# Patient Record
Sex: MALE | Race: BLACK OR AFRICAN AMERICAN | NOT HISPANIC OR LATINO | URBAN - METROPOLITAN AREA
[De-identification: names, ages, dates, MRNs, and addresses within clinical notes are randomized per-mention and may not be internally consistent; named-entity substitution may affect disease eponyms.]

---

## 2021-08-10 ENCOUNTER — EMERGENCY (EMERGENCY)
Facility: HOSPITAL | Age: 71
LOS: 1 days | Discharge: HOME | End: 2021-08-10
Attending: STUDENT IN AN ORGANIZED HEALTH CARE EDUCATION/TRAINING PROGRAM | Admitting: STUDENT IN AN ORGANIZED HEALTH CARE EDUCATION/TRAINING PROGRAM
Payer: MEDICARE

## 2021-08-10 VITALS
OXYGEN SATURATION: 97 % | HEART RATE: 74 BPM | DIASTOLIC BLOOD PRESSURE: 97 MMHG | RESPIRATION RATE: 18 BRPM | TEMPERATURE: 99 F | SYSTOLIC BLOOD PRESSURE: 174 MMHG

## 2021-08-10 VITALS
OXYGEN SATURATION: 100 % | DIASTOLIC BLOOD PRESSURE: 70 MMHG | RESPIRATION RATE: 18 BRPM | HEART RATE: 62 BPM | TEMPERATURE: 99 F | SYSTOLIC BLOOD PRESSURE: 157 MMHG

## 2021-08-10 PROCEDURE — 99284 EMERGENCY DEPT VISIT MOD MDM: CPT

## 2021-08-10 PROCEDURE — 73030 X-RAY EXAM OF SHOULDER: CPT | Mod: 26,RT

## 2021-08-10 PROCEDURE — 73060 X-RAY EXAM OF HUMERUS: CPT | Mod: 26,RT

## 2021-08-10 RX ORDER — IBUPROFEN 200 MG
1 TABLET ORAL
Qty: 20 | Refills: 0
Start: 2021-08-10

## 2021-08-10 RX ORDER — IBUPROFEN 200 MG
600 TABLET ORAL ONCE
Refills: 0 | Status: COMPLETED | OUTPATIENT
Start: 2021-08-10 | End: 2021-08-10

## 2021-08-10 RX ADMIN — Medication 600 MILLIGRAM(S): at 10:34

## 2021-08-10 NOTE — ED PROVIDER NOTE - NSDCPRINTRESULTS_ED_ALL_ED
Impression: Type 2 diabetes mellitus w/o complication: V12.8. Plan: Discussed diagnosis with patient. No diabetic retinopathy. Discussed ocular and systemic benefits of blood sugar control. Will continue to monitor. Patient requests all Lab, Cardiology, and Radiology Results on their Discharge Instructions

## 2021-08-10 NOTE — ED ADULT TRIAGE NOTE - CHIEF COMPLAINT QUOTE
Pt c/o right shoulder pain s/p mechanical fall 2 days ago. Pt states he hit head, denies LOC and denies blood thinners. PMH DM?, htn

## 2021-08-10 NOTE — ED PROVIDER NOTE - OBJECTIVE STATEMENT
71 yo M past medical history dm on metformin presents for right shoulder pain after trip and fall 2 days ago denies head trauma or loc. reports pain to right upper arm. denies other pain, neck pain, cp, sob abd pain, numbness or weakness  Patient denies taking anti-platelet or anti-coagulation agent.  denies etoh or drugs.   reports did come to ed earlier bc no one to drive him until today.

## 2021-08-10 NOTE — ED PROVIDER NOTE - CROS ED ROS STATEMENT
11/14/2017 8:12 AM 
 
Ms. [facility] [address] [place] [address] [facility]: 
 
Please find your most recent results below. Resulted Orders CBC W/O DIFF Result Value Ref Range WBC 5.9 3.4 - 10.8 x10E3/uL  
 RBC 4.90 3.77 - 5.28 x10E6/uL HGB 14.1 11.1 - 15.9 g/dL HCT 42.0 34.0 - 46.6 % MCV 86 79 - 97 fL  
 MCH 28.8 26.6 - 33.0 pg  
 MCHC 33.6 31.5 - 35.7 g/dL  
 RDW 13.7 12.3 - 15.4 % PLATELET 149 850 - 666 x10E3/uL Narrative Performed at:  [address]  [id number] : Quincy Pyle MD, Phone:  [phone] LIPID PANEL Result Value Ref Range Cholesterol, total 223 (H) 100 - 199 mg/dL Triglyceride 62 0 - 149 mg/dL HDL Cholesterol 91 >39 mg/dL VLDL, calculated 12 5 - 40 mg/dL LDL, calculated 120 (H) 0 - 99 mg/dL Narrative Performed at:  [address]  [id number] : Quincy Pyle MD, Phone:  [phone] METABOLIC PANEL, COMPREHENSIVE Result Value Ref Range Glucose 86 65 - 99 mg/dL BUN 16 6 - 24 mg/dL Creatinine 0.90 0.57 - 1.00 mg/dL GFR est non-AA 71 >59 mL/min/1.73 GFR est AA 82 >59 mL/min/1.73  
 BUN/Creatinine ratio 18 9 - 23 Sodium 141 134 - 144 mmol/L Potassium 4.8 3.5 - 5.2 mmol/L Chloride 98 96 - 106 mmol/L  
 CO2 27 18 - 29 mmol/L Calcium 10.1 8.7 - 10.2 mg/dL Protein, total 7.2 6.0 - 8.5 g/dL Albumin 4.8 3.5 - 5.5 g/dL GLOBULIN, TOTAL 2.4 1.5 - 4.5 g/dL A-G Ratio 2.0 1.2 - 2.2 Bilirubin, total 0.5 0.0 - 1.2 mg/dL Alk. phosphatase 58 39 - 117 IU/L  
 AST (SGOT) 12 0 - 40 IU/L  
 ALT (SGPT) 9 0 - 32 IU/L Narrative Performed at:  [address]  [id number] : Quincy Pyle MD, Phone:  [phone] CVD REPORT Result Value Ref Range INTERPRETATION Note Comment: Supplemental report is available. Narrative Performed at:  [address] [place] [address]  [id number] : Brock Keenan PhD, Phone:  [phone] RECOMMENDATIONS: 
 
   
No anemia Total cholesterol over 200, but is because her \"good\" cholesterol is so good -- current 10 year cardiovascular risk is 1.4% (therapy not recommended) Normal glucose Normal kidney function Normal liver function Nice to see you -- hope you continue to do well Please call me if you have any questions: [phone] Sincerely, Prakash Paiz MD 
 all other ROS negative except as per HPI

## 2021-08-10 NOTE — ED PROVIDER NOTE - PHYSICAL EXAMINATION
GEN: no acute respiratory distress. nontoxic, speaking comfortably in full sentences, ambulating with steady gait.  HEENT: NCAT. 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 PERRL 4mm, EOMI, MMM, oropharynx wnl.  Neck: no JVD, trachea midline, no LAD, FROM, non-tender  CV: RRR. +S1S2, no murmur. 2+ pulses in 4 extremities, cap refill <2 sec  Chest: CTA B/l. no wheezing, rales, rhonchi. no retractions. good air movement. no tenderness. no rash or ecchymosis  ABD: +BS, soft, non distended, non tender. No guarding/rebound. No ecchymosis  MSK: No clubbing, cyanosis, edema. decreased ROM of r shoulder. pain with r shoulder ROM. +R prox humeral tenderness to palpation. No midline or paraspinal tenderness. no spinal step-offs.  Neuro: AAOX3. Sensation intact, motor 5/5 throughout, limited strength exam of R shoulder 2/2 pain R elbow wrist digits normal no ataxia  SKIN: No lac, abrasions

## 2021-08-10 NOTE — ED PROVIDER NOTE - CLINICAL SUMMARY MEDICAL DECISION MAKING FREE TEXT BOX
right shoulder upper humerus pain after fall 2 days ago. neurovasc intact. poss fx/dislocation. plan: xr, symptom relief prn reassess 69 yo M past medical history dm on metformin presents with right shoulder upper humerus pain after fall 2 days ago. neurovasc intact. poss fx/dislocation. plan: xr, symptom relief prn reassess

## 2021-08-10 NOTE — ED PROVIDER NOTE - PATIENT PORTAL LINK FT
You can access the FollowMyHealth Patient Portal offered by [facility] by registering at the following website: [email or web address]. By joining [employer]’s FollowMyHealth portal, you will also be able to view your health information using other applications (apps) compatible with our system.

## 2021-08-10 NOTE — ED PROVIDER NOTE - NSFOLLOWUPINSTRUCTIONS_ED_ALL_ED_FT
Take motrin 600mg every 8 hours (3 advil).  Avoid any strenuous activity.  Follow up with an orthopedic within 1-2 weeks.   Return to Ed for any worsening pain, swelling, redness or fever.

## 2021-08-10 NOTE — ED PROVIDER NOTE - ENMT NEGATIVE STATEMENT, MLM
Ears: no ear pain and no hearing problems. no throat pain. Neck: no pain, no stiffness and no swollen glands.

## 2024-09-24 ENCOUNTER — EMERGENCY (EMERGENCY)
Facility: HOSPITAL | Age: 74
LOS: 0 days | Discharge: HOME | End: 2024-09-25
Attending: STUDENT IN AN ORGANIZED HEALTH CARE EDUCATION/TRAINING PROGRAM
Payer: MEDICARE

## 2024-09-24 VITALS
HEART RATE: 88 BPM | SYSTOLIC BLOOD PRESSURE: 148 MMHG | DIASTOLIC BLOOD PRESSURE: 91 MMHG | OXYGEN SATURATION: 97 % | TEMPERATURE: 99 F | WEIGHT: 175.05 LBS | HEIGHT: 71 IN | RESPIRATION RATE: 18 BRPM
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
## 2024-09-24 DIAGNOSIS — S90.921A UNSPECIFIED SUPERFICIAL INJURY OF RIGHT FOOT, INITIAL ENCOUNTER: ICD-10-CM

## 2024-09-24 DIAGNOSIS — E11.9 TYPE 2 DIABETES MELLITUS WITHOUT COMPLICATIONS: ICD-10-CM
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
## 2024-09-24 DIAGNOSIS — S09.90XA UNSPECIFIED INJURY OF HEAD, INITIAL ENCOUNTER: ICD-10-CM

## 2024-09-24 DIAGNOSIS — M79.671 PAIN IN RIGHT FOOT: ICD-10-CM

## 2024-09-24 PROCEDURE — 70450 CT HEAD/BRAIN W/O DYE: CPT | Mod: 26,MC

## 2024-09-24 PROCEDURE — 99284 EMERGENCY DEPT VISIT MOD MDM: CPT

## 2024-09-24 RX ORDER — IBUPROFEN 600 MG
400 TABLET ORAL ONCE
Refills: 0 | Status: COMPLETED | OUTPATIENT
Start: 2024-09-24 | End: 2024-09-24

## 2024-09-24 RX ORDER — ACETAMINOPHEN 325 MG/1
975 TABLET ORAL ONCE
Refills: 0 | Status: COMPLETED | OUTPATIENT
Start: 2024-09-24 | End: 2024-09-24

## 2024-09-24 RX ADMIN — Medication 400 MILLIGRAM(S): at 22:47

## 2024-09-24 RX ADMIN — ACETAMINOPHEN 975 MILLIGRAM(S): 325 TABLET ORAL at 22:47

## 2024-09-24 NOTE — ED PROVIDER NOTE - PHYSICAL EXAMINATION
Gen: aox3, nad,   Head: NCAT  ENT: Airway patent, moist mucous membranes, nasal passageways clear, EOMI, eyes PERRL   Cardiac: Normal rate, normal rhythm  Respiratory: Lungs CTA B/L  Gastrointestinal: Abdomen soft, nontender, nondistended, no rebound, no guarding  MSK: No gross abnormalities, FROM of all four extremities, + ttp dorsal aspect of right foot, mild ttp right ankle, no midline c-t-l spine ttp, normal ROM neck.   HEME: Extremities warm, pulses intact and symmetrical in all four extremities  Skin: No rashes, no lesions  Neuro: No gross neurologic deficits, no motor/sensory deficits, no gait abnormality , + ttp right occiput

## 2024-09-24 NOTE — ED ADULT NURSE NOTE - OBJECTIVE STATEMENT
73 year old male came in c/o fall came in [facility] s/p mechanical fall today. came in  C/o a headache after hitting the back of his head and right ankle pain. Denies loc. Denies use of anticoagulants. Pmh htn, dm, anemia.

## 2024-09-24 NOTE — ED PROVIDER NOTE - PATIENT PORTAL LINK FT
You can access the FollowMyHealth Patient Portal offered by [facility] by registering at the following website: [email or web address]. By joining [employer]’s FollowMyHealth portal, you will also be able to view your health information using other applications (apps) compatible with our system.

## 2024-09-24 NOTE — ED PROVIDER NOTE - NSCAREINITIATED _GEN_ER
Pt returned from XR.  Tolerated procedure well.  Pt 2nd breathing tx in progress.  Will continue to monitor.
Day Whittington(Attending)

## 2024-09-24 NOTE — ED PROVIDER NOTE - CARE PROVIDER_API CALL
Genny Hair  Podiatric Medicine and Surgery  [address] [place] [address]  Phone: [phone]  Fax: [phone]  Follow Up Time:

## 2024-09-24 NOTE — ED ADULT TRIAGE NOTE - CHIEF COMPLAINT QUOTE
Patient BIB [facility] s/p mechanical fall today. C/o a headache after hitting the back of his head and right ankle pain. Denies loc. Denies use of anticoagulants. Pmh htn, dm, anemia.

## 2024-09-24 NOTE — ED PROVIDER NOTE - NSFOLLOWUPINSTRUCTIONS_ED_ALL_ED_FT
You were seen today for head injury and right foot pain    There was no fracture on xray for right foot - if there is a discrepancy or if we missed something then we will call you    Follow up with a podiatrist for further care regarding your foot pain    Follow up with primary care physician    Even though there was no signs of brain injury on CT scan, you may have symptoms of a concussion - please see attached sheet    .You can continue over the counter pain medication for pain control     SEEK IMMEDIATE MEDICAL CARE IF YOU HAVE ANY OF THE FOLLOWING SYMPTOMS: fever, severe headache, confusion, chest pain, shortness of breath, vomiting, stiff neck, loss of vision, problems with speech, muscle weakness, loss of balance, trouble walking, passing out, or confusion.

## 2024-09-24 NOTE — ED PROVIDER NOTE - PROGRESS NOTE DETAILS
Whittington DO: pt still has some focal scalp tenderness at occipal region but otherwise neuro intact, ambulatory, no new symptoms or concerns, outpt follow up and return precautions discussed

## 2024-09-25 VITALS
OXYGEN SATURATION: 97 % | RESPIRATION RATE: 19 BRPM | DIASTOLIC BLOOD PRESSURE: 85 MMHG | HEART RATE: 82 BPM | TEMPERATURE: 98 F | SYSTOLIC BLOOD PRESSURE: 127 MMHG

## 2024-09-25 PROBLEM — E11.9 TYPE 2 DIABETES MELLITUS WITHOUT COMPLICATIONS: Chronic | Status: ACTIVE | Noted: 2021-08-10

## 2024-09-25 PROCEDURE — 73630 X-RAY EXAM OF FOOT: CPT | Mod: 26,RT

## 2024-09-25 PROCEDURE — 73610 X-RAY EXAM OF ANKLE: CPT | Mod: 26,RT

## 2025-01-29 NOTE — ED PROVIDER NOTE - TEMPLATE, MLM
"HPI:  Patient is a 73-year-old gentleman who comes in today for follow-up of his hypertension, lipids, impaired fasting glucose, and coronary disease.  Patient states he has been doing well.  He denies any chest pains or shortness a breath.  He does have paroxysmal AFib is followed by his cardiologist.  He states that the cardiologist told him he did not need to be on blood thinners.  I encouraged him to readdress that with him.  There been no new problems or complaints  Current meds have been verified and updated per the EMR  Exam:/80 (BP Location: Right arm, Patient Position: Sitting)   Pulse 71   Ht 5' 9" (1.753 m)   Wt 104.6 kg (230 lb 9.6 oz)   SpO2 96%   BMI 34.05 kg/m²   Carotids 2+ equal without bruits, neck is supple without adenopathy  Chest clear  Cardiovascular regular rate and rhythm without murmur gallop or rub    Lab Results   Component Value Date    WBC 7.04 07/01/2021    HGB 15.0 07/01/2021    HCT 46.3 07/01/2021     07/01/2021    CHOL 148 01/24/2025    TRIG 101 01/24/2025    HDL 35 (L) 01/24/2025    ALT 30 01/24/2025    AST 25 01/24/2025     01/24/2025    K 5.3 (H) 01/24/2025     01/24/2025    CREATININE 1.3 01/24/2025    BUN 21 01/24/2025    CO2 26 01/24/2025    TSH 2.056 08/26/2024    PSA <0.01 11/21/2018    HGBA1C 5.8 (H) 01/24/2025    PSADIAG <0.01 08/26/2024    URICACID 7.7 (H) 10/09/2024    SEDRATE 13 10/09/2024    CRP 1.7 10/09/2024       Impression:  Hypertension, lipids, impaired fasting glucose all well controlled on current meds  Coronary artery disease/paroxysmal AFib, stable, followed by his cardiologist  Major depressive disorder, stable on current meds  Patient Active Problem List   Diagnosis    HTN (hypertension)    Anxiety    History of colon polyps    Lumbar back pain    CAD (coronary artery disease)    History of prostate cancer    Paroxysmal A-fib    BPH (benign prostatic hyperplasia)    Recurrent major depressive disorder, in partial remission    " IFG (impaired fasting glucose)    Hyperlipidemia    Obesity (BMI 30.0-34.9)    Aortic atherosclerosis    GREYSON on CPAP       Plan:  Orders Placed This Encounter    Hemoglobin A1C    Comprehensive Metabolic Panel    Lipid Panel    TSH    Microalbumin/Creatinine Ratio, Urine    Prostate Specific Antigen, Diagnostic    amLODIPine (NORVASC) 5 MG tablet   Medications remain the same.  He will be seen again in 6 months with the above lab work.      This note is generated with speech recognition software and is subject to transcription error and sound alike phrases that may be missed by proofreading.       Orthopedic

## 2025-04-06 NOTE — ED PROVIDER NOTE - CROS ED RESP ALL NEG
Attempted to see patient for PT session; patient in bed in supine position.  RN and patient reported that patient ambulated in the hallway.  Attempted to have patient perform exs in bed, bed  mobility, transfers, gait but patient refused at this time reporting feeling tired.  Patient was encouraged but still refused.  Patient was educated on ankle pumps and heel slides and encouraged to do them as able, encouraged to be OOB outside of PT sessions; patient verbalized understanding.  Will continue to follow-up.    negative...